# Patient Record
Sex: FEMALE | Race: WHITE | URBAN - METROPOLITAN AREA
[De-identification: names, ages, dates, MRNs, and addresses within clinical notes are randomized per-mention and may not be internally consistent; named-entity substitution may affect disease eponyms.]

---

## 2020-02-10 ENCOUNTER — TELEPHONE (OUTPATIENT)
Dept: TRANSPLANT | Facility: CLINIC | Age: 70
End: 2020-02-10

## 2020-02-10 NOTE — TELEPHONE ENCOUNTER
----- Message from Kaleb Coep sent at 2/10/2020  3:21 PM CST -----     [place] [facility] [place] call referring MD office if we need any additional information on the pt.      ----- Message -----  From: Marlon Aguirre  Sent: 2/10/2020   3:12 PM CST  To: [facility] Referral Pool    Good afternoon,     Dr. Guthrie would like to refer the following patient to the hepatology department for ROLLINS. I have scanned the patients referral and records into . If there are any further questions in regards to the patient, please contact Dr. Guthrie 's office at, [phone].   Please let me know if I can help schedule in any way.  Thank you,   Marlon   Ext. [phone]  [facility]

## 2020-02-25 ENCOUNTER — DOCUMENTATION ONLY (OUTPATIENT)
Dept: TRANSPLANT | Facility: CLINIC | Age: 70
End: 2020-02-25

## 2020-02-25 NOTE — LETTER
February 25, 2020    Samer Allen Son MD  [address]  [place] [address]      Dear Dr. Higgins    Patient: Mariel Alberts   MR Number: [id number]   YOB: 1950     Thank you for the referral of Mariel Alberts to the [facility] program. An initial appointment will be scheduled for your patient with one of our Hepatologists.      Thank you again for your trust in our program.  If there is anything we can do for you or your staff, please feel free to contact us.        Sincerely,        [facility]  [facility] [place] [address]  [phone]

## 2020-02-25 NOTE — LETTER
February 25, 2020    Mariel Alberts  [address] [place] [address]      Dear Mariel Alberts:    Your doctor has referred you to the [facility]. We are sending this letter to remind you to make an appointment with us to complete the referral process.     Please call us at [phone] to schedule an appointment. We look forward to seeing you soon.     If you received a call and have been scheduled, please disregard this letter.       Sincerely,        [facility]   [address] [place] [id number]  [phone]

## 2020-02-26 NOTE — NURSING
Pt records reviewed.  Pt will be referred to Hepatology due to ROLLINS  Initial referral received  from Dr. Efrain Higgins  Referral letter sent to provider and patient.

## 2020-02-28 ENCOUNTER — TELEPHONE (OUTPATIENT)
Dept: HEPATOLOGY | Facility: CLINIC | Age: 70
End: 2020-02-28

## 2020-03-10 ENCOUNTER — TELEPHONE (OUTPATIENT)
Dept: HEPATOLOGY | Facility: CLINIC | Age: 70
End: 2020-03-10

## 2020-03-10 NOTE — TELEPHONE ENCOUNTER
Received a call from patient she is trying to schedule her appointment. She accepted 5/7 mailed appt reminder to patient.

## 2020-04-27 ENCOUNTER — TELEPHONE (OUTPATIENT)
Dept: HEPATOLOGY | Facility: CLINIC | Age: 70
End: 2020-04-27

## 2020-04-27 NOTE — TELEPHONE ENCOUNTER
MA called patient to convert her appt to a video visit she is willing to do that. Sent her the code she will sign up on our my [facility] and download the my chart jovanny. After that she will give me a callback.

## 2020-04-27 NOTE — TELEPHONE ENCOUNTER
----- Message from Addi Ugarte sent at 4/27/2020  1:39 PM CDT -----  Pt would like a call back returning Gracia Montague call         Pt can be reached at [phone]

## 2020-05-06 ENCOUNTER — TELEPHONE (OUTPATIENT)
Dept: HEPATOLOGY | Facility: CLINIC | Age: 70
End: 2020-05-06

## 2020-05-06 ENCOUNTER — PATIENT MESSAGE (OUTPATIENT)
Dept: HEPATOLOGY | Facility: CLINIC | Age: 70
End: 2020-05-06

## 2020-05-07 ENCOUNTER — PATIENT MESSAGE (OUTPATIENT)
Dept: HEPATOLOGY | Facility: CLINIC | Age: 70
End: 2020-05-07

## 2020-05-07 ENCOUNTER — OFFICE VISIT (OUTPATIENT)
Dept: HEPATOLOGY | Facility: CLINIC | Age: 70
End: 2020-05-07
Payer: COMMERCIAL

## 2020-05-07 ENCOUNTER — TELEPHONE (OUTPATIENT)
Dept: HEPATOLOGY | Facility: CLINIC | Age: 70
End: 2020-05-07

## 2020-05-07 DIAGNOSIS — G93.40 ENCEPHALOPATHY: ICD-10-CM

## 2020-05-07 DIAGNOSIS — K74.69 OTHER CIRRHOSIS OF LIVER: Primary | ICD-10-CM

## 2020-05-07 DIAGNOSIS — K75.81 NONALCOHOLIC STEATOHEPATITIS: ICD-10-CM

## 2020-05-07 PROCEDURE — 99204 PR OFFICE/OUTPT VISIT, NEW, LEVL IV, 45-59 MIN: ICD-10-PCS | Mod: 95,,, | Performed by: INTERNAL MEDICINE

## 2020-05-07 PROCEDURE — 99204 OFFICE O/P NEW MOD 45 MIN: CPT | Mod: 95,,, | Performed by: INTERNAL MEDICINE

## 2020-05-07 RX ORDER — CYCLOBENZAPRINE HCL 10 MG
TABLET ORAL
COMMUNITY
Start: 2018-04-01

## 2020-05-07 RX ORDER — LOSARTAN POTASSIUM 50 MG/1
50 TABLET ORAL DAILY
COMMUNITY
Start: 2020-04-27

## 2020-05-07 RX ORDER — NIFEDIPINE 30 MG/1
30 TABLET, EXTENDED RELEASE ORAL DAILY
COMMUNITY
Start: 2020-04-27

## 2020-05-07 RX ORDER — LACTULOSE 10 G/15ML
10 SOLUTION ORAL 2 TIMES DAILY
Qty: 900 ML | Refills: 6 | Status: SHIPPED | OUTPATIENT
Start: 2020-05-07 | End: 2020-05-17

## 2020-05-07 RX ORDER — LEVOTHYROXINE SODIUM 75 UG/1
75 TABLET ORAL DAILY
COMMUNITY
Start: 2020-01-30

## 2020-05-07 RX ORDER — ALBUTEROL SULFATE 90 UG/1
2 AEROSOL, METERED RESPIRATORY (INHALATION) EVERY 4 HOURS PRN
COMMUNITY
Start: 2020-04-27

## 2020-05-07 RX ORDER — MONTELUKAST SODIUM 10 MG/1
10 TABLET ORAL DAILY
COMMUNITY
Start: 2019-02-01

## 2020-05-07 RX ORDER — PREGABALIN 300 MG/1
300 CAPSULE ORAL 2 TIMES DAILY
COMMUNITY
Start: 2010-02-04

## 2020-05-07 RX ORDER — AMITRIPTYLINE HYDROCHLORIDE 25 MG/1
TABLET, FILM COATED ORAL DAILY
COMMUNITY
Start: 2018-02-04

## 2020-05-07 RX ORDER — PANTOPRAZOLE SODIUM 40 MG/1
40 TABLET, DELAYED RELEASE ORAL DAILY
COMMUNITY
Start: 2020-03-20

## 2020-05-07 RX ORDER — DULOXETIN HYDROCHLORIDE 60 MG/1
60 CAPSULE, DELAYED RELEASE ORAL 2 TIMES DAILY
COMMUNITY
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
## 2020-05-07 NOTE — Clinical Note
Plan: truncated note: send AVS-  Labs today, then every 6 months;  CBC, CMP, PT INR, AFP-  US abd limited every 6 months, next now. - Low Carbohydr-  Low salt in the diet, avoid canned, bottled and processed foods.-  Continue current meds-  Avoid alcohol, smoking, sedatives and meds with codeine.-  No driving-  Use sliding scale for lactulose as follows: Take 1 tbsp 15 mL at 8AM.  If no stool by 12 noon, take 15 mL more of lactulose.  If <2 stools by 3 PM, take 30 mL more of lactulose.  Goal is 3-4 soft stools daily.-  Avoid high intake of Tylenol (more than 4 extra-strength pills in one day)-  Call us if any bleeding, fevers, confusion, disorientation occ-  Return in 6 months.-  Copy note to PCP Dr. James ferguson and Dr. Lucas

## 2020-05-07 NOTE — TELEPHONE ENCOUNTER
----- Message from Jessica Hays MD sent at 5/7/2020 11:48 AM CDT -----  Plan: truncated note: send AVS  -  Labs today, then every 6 months;  CBC, CMP, PT INR, AFP  -  US abd limited every 6 months, next now.   - Low Carbohydr  -  Low salt in the diet, avoid canned, bottled and processed foods.  -  Continue current meds  -  Avoid alcohol, smoking, sedatives and meds with codeine.  -  No driving  -  Use sliding scale for lactulose as follows:   Take 1 tbsp 15 mL at 8AM.  If no stool by 12 noon, take 15 mL more of lactulose.  If <2 stools by 3 PM, take 30 mL more of lactulose.  Goal is 3-4 soft stools daily.  -  Avoid high intake of Tylenol (more than 4 extra-strength pills in one day)  -  Call us if any bleeding, fevers, confusion, disorientation occ  -  Return in 6 months.  -  Copy note to PCP Dr. James ferguson and Dr. Lucas

## 2020-05-07 NOTE — PATIENT INSTRUCTIONS
Recommendations:  -  Labs today, then every 6 months;  CBC, CMP, PT INR, AFP  -  US abd limited every 6 months, next now.   - Low Carbohydrate Diet:  Limit intake of listed foods (below) to half of current intake.    Foods to limit are: Rice, potatoes, corn, corn starch- and flour-containing food products (e.g., breads, pastas, cookies, cakes, etc.), sweets and fruits.  Avoid alcohol.    Proteins can be substituted in place of carbs.  Generally fish is good as a source of fish oil.    -  Low salt in the diet, avoid canned, bottled and processed foods.  -  Continue current meds  -  Avoid alcohol, smoking, sedatives and meds with codeine.  -  No driving  -  Use sliding scale for lactulose as follows:   Take 1 tbsp 15 mL at 8AM.  If no stool by 12 noon, take 15 mL more of lactulose.  If <2 stools by 3 PM, take 30 mL more of lactulose.  Goal is 3-4 soft stools daily.  -  Avoid high intake of Tylenol (more than 4 extra-strength pills in one day)  -  Call us if any bleeding, fevers, confusion, disorientation occur  -  Endoscopy: to be done by Dr. Suarez   -  Transplant option discussed, will evaluate when MELD >15.  -  Return in 6 months.

## 2020-05-07 NOTE — LETTER
May 7, 2020      ANNMARIE Vásquez  [address]  [place] [address]           [facility] - [facility]at[facility]  [address]  [facility] [address]  Phone: [phone]  Fax: [phone]          Patient: Mariel Alberts   MR Number: [id number]   YOB: 1950   Date of Visit: 5/7/2020       Dear Annmarie Vásquez:    Thank you for referring Mariel Alberts to me for evaluation. Attached you will find relevant portions of my assessment and plan of care.    If you have questions, please do not hesitate to call me. I look forward to following Mariel Alberts along with you.    Sincerely,    Jessica Hays MD    Enclosure  CC:  No Recipients    If you would like to receive this communication electronically, please contact [email or web address] or [phone] to request more information on [employer] access.    For providers and/or their staff who would like to refer a patient to [facility], please contact us through our one-stop-shop provider referral line, [facility], at [phone].    If you feel you have received this communication in error or would no longer like to receive these types of communications, please e-mail [email or web address]

## 2020-05-27 LAB
AFP: 5.4
ALBUMIN/GLOBULIN RATIO: 1.7
ALBUMIN: 4.1
ALP ISOS SERPL LEV INH-CCNC: 88 U/L
ALT: 61
AST: 60
BILIRUBIN TOTAL: 0.5
BUN BLD-MCNC: 11 MG/DL (ref 4–21)
CALCIUM SERPL-MCNC: 8.8 MG/DL
CARBON DIOXIDE, CO2: 28
CHLORIDE: 104
CREAT SERPL-MCNC: 0.8 MG/DL
EGFR IF AFRICAN AMERICAN: 89
EST. GFR  (NON AFRICAN AMERICAN): 77 MG/DL
GLOBULIN: 2.4
GLUCOSE: 90
HCT: 40.4
HGB BLD-MCNC: 13.8 G/DL (ref 12–16)
INR PPP: 1 (ref 2–3)
PLATELET # BLD AUTO: 167 K/?L (ref 150–399)
POTASSIUM: 4.3
PROTEIN TOTAL: 6.5
PT: 10.3
RBC # BLD AUTO: 4.46 10*6/UL
SODIUM: 138
WBC: 5.5

## 2020-05-28 ENCOUNTER — TELEPHONE (OUTPATIENT)
Dept: HEPATOLOGY | Facility: CLINIC | Age: 70
End: 2020-05-28

## 2020-11-30 ENCOUNTER — TELEPHONE (OUTPATIENT)
Dept: HEPATOLOGY | Facility: CLINIC | Age: 70
End: 2020-11-30

## 2020-11-30 NOTE — TELEPHONE ENCOUNTER
Called patient she said that she is being followed by Dr. Gupta she will have her recent labs and Utrasound to be fax to us so Dr. washington scan review them.     Unable to do virtual visit due to patient reside in [place].

## 2020-11-30 NOTE — TELEPHONE ENCOUNTER
----- Message from Leticia Rizo sent at 11/30/2020 10:46 AM CST -----  Regarding: Appt Access  Contact: Pt @ [phone]  Pt stating that her Dr. In [place] sees her every 6 months and wants to make sure she actually also needs to be seen by hepatology here in [place] as well. Said if that is the case she would like to schedule virtual. She just had blood work done today and her ultrasound was done a month ago, and she was told she would not need another.     [facility] - Ultrasound  [facility] - Lab work    Call back: [phone]

## 2020-12-29 ENCOUNTER — PATIENT MESSAGE (OUTPATIENT)
Dept: HEPATOLOGY | Facility: CLINIC | Age: 70
End: 2020-12-29

## 2021-06-16 ENCOUNTER — PATIENT MESSAGE (OUTPATIENT)
Dept: HEPATOLOGY | Facility: CLINIC | Age: 71
End: 2021-06-16

## 2021-06-16 ENCOUNTER — TELEPHONE (OUTPATIENT)
Dept: HEPATOLOGY | Facility: CLINIC | Age: 71
End: 2021-06-16

## 2021-06-25 ENCOUNTER — PATIENT MESSAGE (OUTPATIENT)
Dept: HEPATOLOGY | Facility: CLINIC | Age: 71
End: 2021-06-25

## 2021-06-25 DIAGNOSIS — G93.40 ENCEPHALOPATHY: Primary | ICD-10-CM

## 2021-06-25 RX ORDER — LACTULOSE 10 G/15ML
SOLUTION ORAL; RECTAL
Qty: 762 ML | Refills: 0 | Status: CANCELLED | OUTPATIENT
Start: 2021-06-25

## 2021-06-25 RX ORDER — LACTULOSE 10 G/15ML
10 SOLUTION ORAL 2 TIMES DAILY
Qty: 900 ML | Refills: 11 | Status: SHIPPED | OUTPATIENT
Start: 2021-06-25 | End: 2021-07-07 | Stop reason: SINTOL

## 2021-07-07 ENCOUNTER — OFFICE VISIT (OUTPATIENT)
Dept: HEPATOLOGY | Facility: CLINIC | Age: 71
End: 2021-07-07
Payer: COMMERCIAL

## 2021-07-07 VITALS
RESPIRATION RATE: 18 BRPM | HEIGHT: 65 IN | BODY MASS INDEX: 31.7 KG/M2 | OXYGEN SATURATION: 96 % | DIASTOLIC BLOOD PRESSURE: 63 MMHG | WEIGHT: 190.25 LBS | TEMPERATURE: 98 F | HEART RATE: 79 BPM | SYSTOLIC BLOOD PRESSURE: 138 MMHG

## 2021-07-07 DIAGNOSIS — G93.40 ENCEPHALOPATHY: Primary | ICD-10-CM

## 2021-07-07 PROCEDURE — 1159F MED LIST DOCD IN RCRD: CPT | Mod: S$GLB,,, | Performed by: INTERNAL MEDICINE

## 2021-07-07 PROCEDURE — 1101F PR PT FALLS ASSESS DOC 0-1 FALLS W/OUT INJ PAST YR: ICD-10-PCS | Mod: CPTII,S$GLB,, | Performed by: INTERNAL MEDICINE

## 2021-07-07 PROCEDURE — 99999 PR PBB SHADOW E&M-EST. PATIENT-LVL IV: ICD-10-PCS | Mod: PBBFAC,,, | Performed by: INTERNAL MEDICINE

## 2021-07-07 PROCEDURE — 1159F PR MEDICATION LIST DOCUMENTED IN MEDICAL RECORD: ICD-10-PCS | Mod: S$GLB,,, | Performed by: INTERNAL MEDICINE

## 2021-07-07 PROCEDURE — 1125F AMNT PAIN NOTED PAIN PRSNT: CPT | Mod: S$GLB,,, | Performed by: INTERNAL MEDICINE

## 2021-07-07 PROCEDURE — 3008F BODY MASS INDEX DOCD: CPT | Mod: CPTII,S$GLB,, | Performed by: INTERNAL MEDICINE

## 2021-07-07 PROCEDURE — 1125F PR PAIN SEVERITY QUANTIFIED, PAIN PRESENT: ICD-10-PCS | Mod: S$GLB,,, | Performed by: INTERNAL MEDICINE

## 2021-07-07 PROCEDURE — 3008F PR BODY MASS INDEX (BMI) DOCUMENTED: ICD-10-PCS | Mod: CPTII,S$GLB,, | Performed by: INTERNAL MEDICINE

## 2021-07-07 PROCEDURE — 99213 PR OFFICE/OUTPT VISIT, EST, LEVL III, 20-29 MIN: ICD-10-PCS | Mod: S$GLB,,, | Performed by: INTERNAL MEDICINE

## 2021-07-07 PROCEDURE — 3288F PR FALLS RISK ASSESSMENT DOCUMENTED: ICD-10-PCS | Mod: CPTII,S$GLB,, | Performed by: INTERNAL MEDICINE

## 2021-07-07 PROCEDURE — 1101F PT FALLS ASSESS-DOCD LE1/YR: CPT | Mod: CPTII,S$GLB,, | Performed by: INTERNAL MEDICINE

## 2021-07-07 PROCEDURE — 99999 PR PBB SHADOW E&M-EST. PATIENT-LVL IV: CPT | Mod: PBBFAC,,, | Performed by: INTERNAL MEDICINE

## 2021-07-07 PROCEDURE — 3288F FALL RISK ASSESSMENT DOCD: CPT | Mod: CPTII,S$GLB,, | Performed by: INTERNAL MEDICINE

## 2021-07-07 PROCEDURE — 99213 OFFICE O/P EST LOW 20 MIN: CPT | Mod: S$GLB,,, | Performed by: INTERNAL MEDICINE

## 2021-07-07 RX ORDER — LACTULOSE 10 G/15ML
10 SOLUTION ORAL 2 TIMES DAILY
Qty: 1800 ML | Refills: 5 | Status: SHIPPED | OUTPATIENT
Start: 2021-07-07

## 2021-08-06 ENCOUNTER — PATIENT MESSAGE (OUTPATIENT)
Dept: HEPATOLOGY | Facility: CLINIC | Age: 71
End: 2021-08-06

## 2021-08-09 ENCOUNTER — TELEPHONE (OUTPATIENT)
Dept: HEPATOLOGY | Facility: CLINIC | Age: 71
End: 2021-08-09

## 2021-08-09 ENCOUNTER — PATIENT MESSAGE (OUTPATIENT)
Dept: HEPATOLOGY | Facility: CLINIC | Age: 71
End: 2021-08-09

## 2021-08-09 LAB
AFP - TUMOR MARKER: 4.8
ALBUMIN: 4
ALP ISOS SERPL LEV INH-CCNC: 81 U/L
ALT: 31
AST: 35
BUN FLUID: 15
CREAT SERPL-MCNC: 0.8 MG/DL
HCT VFR BLD AUTO: 39.2 % (ref 36–46)
HGB BLD-MCNC: 12.7 G/DL (ref 12–16)
INR PPP: 1 (ref 2–3)
PLATELET # BLD AUTO: 233 K/UL (ref 150–399)
POTASSIUM: 4.3 MMOL/L
PT: 10.2
RBC # BLD AUTO: 4.59 10*6/UL
SODIUM: 138 MMOL/L
TOTAL BILIRUBIN POC: 0.4
TOTAL PROTEIN: 6.6 G/DL (ref 6.4–8.2)
WBC: 6.8

## 2021-08-10 ENCOUNTER — TELEPHONE (OUTPATIENT)
Dept: HEPATOLOGY | Facility: CLINIC | Age: 71
End: 2021-08-10

## 2021-11-13 ENCOUNTER — PATIENT MESSAGE (OUTPATIENT)
Dept: HEPATOLOGY | Facility: CLINIC | Age: 71
End: 2021-11-13
Payer: COMMERCIAL

## 2021-11-15 ENCOUNTER — TELEPHONE (OUTPATIENT)
Dept: HEPATOLOGY | Facility: CLINIC | Age: 71
End: 2021-11-15
Payer: COMMERCIAL

## 2021-11-15 DIAGNOSIS — K74.69 OTHER CIRRHOSIS OF LIVER: Primary | ICD-10-CM
